# Patient Record
Sex: FEMALE | Race: WHITE | URBAN - METROPOLITAN AREA
[De-identification: names, ages, dates, MRNs, and addresses within clinical notes are randomized per-mention and may not be internally consistent; named-entity substitution may affect disease eponyms.]

---

## 2019-02-18 ENCOUNTER — HISTORICAL (OUTPATIENT)
Dept: ADMINISTRATIVE | Facility: HOSPITAL | Age: 19
End: 2019-02-18

## 2019-02-18 LAB
FLUAV AG NPH QL IA: NEGATIVE
FLUBV AG NPH QL IA: NEGATIVE

## 2019-10-10 ENCOUNTER — HISTORICAL (OUTPATIENT)
Dept: ADMINISTRATIVE | Facility: HOSPITAL | Age: 19
End: 2019-10-10

## 2019-10-10 LAB
ABS NEUT (OLG): 9.5 X10(3)/MCL (ref 2.1–9.2)
ALBUMIN SERPL-MCNC: 4.6 GM/DL (ref 3.4–5)
ALBUMIN/GLOB SERPL: 1.53 {RATIO} (ref 1.5–2.5)
ALP SERPL-CCNC: 72 UNIT/L (ref 38–126)
ALT SERPL-CCNC: 28 UNIT/L (ref 7–52)
APPEARANCE, UA: ABNORMAL
AST SERPL-CCNC: 38 UNIT/L (ref 15–37)
BACTERIA #/AREA URNS AUTO: ABNORMAL /HPF
BILIRUB SERPL-MCNC: 0.6 MG/DL (ref 0.2–1)
BILIRUB UR QL STRIP: ABNORMAL MG/DL
BILIRUBIN DIRECT+TOT PNL SERPL-MCNC: 0.1 MG/DL (ref 0–0.5)
BILIRUBIN DIRECT+TOT PNL SERPL-MCNC: 0.5 MG/DL
BUN SERPL-MCNC: 16 MG/DL (ref 7–18)
CALCIUM SERPL-MCNC: 9.8 MG/DL (ref 8.5–10)
CHLORIDE SERPL-SCNC: 100 MMOL/L (ref 98–107)
CO2 SERPL-SCNC: 18 MMOL/L (ref 21–32)
COLOR UR: YELLOW
CREAT SERPL-MCNC: 0.82 MG/DL (ref 0.6–1.3)
ERYTHROCYTE [DISTWIDTH] IN BLOOD BY AUTOMATED COUNT: 12.9 % (ref 11.5–17)
FLUAV AG NPH QL IA: NEGATIVE
FLUBV AG NPH QL IA: NEGATIVE
GLOBULIN SER-MCNC: 3 GM/DL (ref 1.2–3)
GLUCOSE (UA): ABNORMAL MG/DL
GLUCOSE SERPL-MCNC: 182 MG/DL (ref 74–106)
HCT VFR BLD AUTO: 40.6 % (ref 37–47)
HGB BLD-MCNC: 13.9 GM/DL (ref 12–16)
HGB UR QL STRIP: NEGATIVE UNIT/L
KETONES UR QL STRIP: ABNORMAL MG/DL
LEUKOCYTE ESTERASE UR QL STRIP: NEGATIVE UNIT/L
LYMPHOCYTES # BLD AUTO: 1.3 X10(3)/MCL (ref 0.6–3.4)
LYMPHOCYTES NFR BLD AUTO: 10.8 % (ref 13–40)
MCH RBC QN AUTO: 30.6 PG (ref 27–31.2)
MCHC RBC AUTO-ENTMCNC: 34 GM/DL (ref 32–36)
MCV RBC AUTO: 89 FL (ref 80–94)
MONOCYTES # BLD AUTO: 1.6 X10(3)/MCL (ref 0.1–1.3)
MONOCYTES NFR BLD AUTO: 12.8 % (ref 0.1–24)
NEUTROPHILS NFR BLD AUTO: 76.4 % (ref 47–80)
NITRITE UR QL STRIP.AUTO: NEGATIVE
PH UR STRIP: 5.5 [PH]
PLATELET # BLD AUTO: 429 X10(3)/MCL (ref 130–400)
PMV BLD AUTO: 8.4 FL (ref 9.4–12.4)
POTASSIUM SERPL-SCNC: 4.5 MMOL/L (ref 3.5–5.1)
PROT SERPL-MCNC: 7.6 GM/DL (ref 6.4–8.2)
PROT UR QL STRIP: ABNORMAL MG/DL
RBC # BLD AUTO: 4.54 X10(6)/MCL (ref 4.2–5.4)
RBC #/AREA URNS HPF: ABNORMAL /HPF
SODIUM SERPL-SCNC: 136 MMOL/L (ref 136–145)
SP GR UR STRIP: 1.02
SQUAMOUS EPITHELIAL, UA: ABNORMAL /LPF
UROBILINOGEN UR STRIP-ACNC: 0.2 MG/DL
WBC # SPEC AUTO: 12.4 X10(3)/MCL (ref 4.5–11.5)
WBC #/AREA URNS AUTO: ABNORMAL /[HPF]

## 2022-04-10 ENCOUNTER — HISTORICAL (OUTPATIENT)
Dept: ADMINISTRATIVE | Facility: HOSPITAL | Age: 22
End: 2022-04-10

## 2022-04-28 VITALS
DIASTOLIC BLOOD PRESSURE: 82 MMHG | OXYGEN SATURATION: 98 % | WEIGHT: 170.88 LBS | BODY MASS INDEX: 27.46 KG/M2 | SYSTOLIC BLOOD PRESSURE: 112 MMHG | HEIGHT: 66 IN

## 2022-05-03 NOTE — HISTORICAL [facility] CERNER
This is a historical note converted from Yas. Formatting and pictures may have been removed.  Please reference Yas for original formatting and attached multimedia. Chief Complaint  vomiting, bodyaches  History of Present Illness  2 day history of myalgias, nausea and vomiting.? Denies any?urinary complaints,?abdominal pain or diarrhea. ?History of type 1 diabetes.? She has had?low blood sugars today due to decreased?oral intake.? Difficulty?keeping fluids?down.? Denies any fever.? Patient had a similar episode in August.? 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  Review of Systems  Constitutional:?No weight loss, no fever, fatigue, chills, no night sweats,?no weakness  Eyes:?No blurred vision,?no redness,?no drainage,?no ocular?pain  HEENT:?No sore throat,?no ear pain, no sinus pressure, no nasal congestion, no rhinorrhea, no postnasal drip  Respiratory:?No cough, no wheezing, no sputum production, no shortness of breath  Cardiovascular:?No chest pain, no palpitations, no dyspnea on exertion,?no orthopnea  Gastrointestinal:?nausea, vomiting, no abdominal pain, no diarrhea,?no constipation, no melena,?no hematochezia  Genitourinary:?No dysuria, no hematuria, no frequency, no urgency, no incontinence, no discharge  Musculoskeletal:?myalgias, no arthralgias, no weakness, no joint effusion, no edema  Integumentary:?No rashes, no hives, no itching, no lesions, no jaundice  Neurologic:?No headaches, no numbness, no tingling, no weakness, no dizziness  Psychiatric:?No anxiety, no irritability, no depression,?no suicidal ideations, no?homicidal ideations,?no delusions, no hallucinations  Endocrine:?No polyuria, no polydipsia, no polyphagia  Hematology:?No bruising, no lymphadenopathy,?no paleness  ?  Physical Exam  Vitals & Measurements  T:?37.2? ?C (Oral)? HR:?110(Peripheral)? BP:?108/74?  HT:?167?cm? WT:?66.6?kg? BMI:?23.88? LMP:?09/22/2019 00:00 CDT?  General:?Appears  ill  Eye:?PERRLA, EOMI, Clear conjunctiva, Eyelids unremarkable  Ears:?Bilateral EAC clear?and?Bilateral TM clear  Nose:? Mucosa normal, No rhinorrhea, No lesions  O/P:?Mucous membranes dry,?no?erythema,?No tonsillar hypertrophy,?No tonsillar exudates,?No cobblestoning  Neck:?Soft, Nontender, No thyromegaly, Full range of motion, No cervical lymphadenopathy, No JVD  Cardiovascular:?Regular rate and rhythm, No murmurs, No gallops, No rubs  Respiratory:?Clear to auscultation bilaterally, No wheezes, No rhonchi, No?crackles  Abdomen:? Soft, Nontender, No hepatosplenomegaly, Normal and equal bowel sounds, No masses, No rebound, No guarding  Integumentary:?No rashes or skin lesions  ?  Assessment/Plan  1.?Gastroenteritis?K52.9  ?CBC, CMP, urinalysis today  Rapid flu-negative  Zofran 8 mg?x 1 dose now  Zofran 8 mg ODT every 8 hours as needed nausea vomiting  Alternate with Phenergan 25 mg every 6 hours as needed nausea vomiting if needed  Ordered:  ondansetron, 8 mg, Oral, Once-Unscheduled, first dose 10/10/19 12:20:00 CDT  ?  2.?Type 1 diabetes?E10.9  Discussed efforts to maintain adequate?intake and?decrease insulin?administration  ?  Orders:  ondansetron, 8 mg = 1 tab(s), Oral, q8hr, PRN PRN nausea/vomiting, # 10 tab(s), 0 Refill(s), Pharmacy: [facility]/pharmacy #[id number]  promethazine, 25 mg = 1 tab(s), Oral, q6hr, PRN PRN as needed for nausea/vomiting, # 10 tab(s), 0 Refill(s), Pharmacy: [facility]/pharmacy #[id number]  CBC w/ Auto Diff, Routine collect, 10/10/19 12:13:00 CDT, Blood, Order for future visit, Stop date 10/10/19 12:13:00 CDT, Lab Collect, Nausea & vomiting, 10/10/19 12:13:00 CDT  Comprehensive Metabolic Panel, Routine collect, 10/10/19 12:13:00 CDT, Blood, Order for future visit, Stop date 10/10/19 12:13:00 CDT, Lab Collect, Nausea & vomiting, 10/10/19 12:13:00 CDT  Lab Collection Request, 10/10/19 12:13:00 CDT, BLANK AMB - [facility], 10/10/19 12:13:00 CDT  Office/Outpatient Visit Level 4 Established [id number] PC, Nausea & vomiting,  [facility] - [facility], 10/10/19 12:13:00 CDT  Urinalysis Complete no reflex, Routine collect, Urine, Order for future visit, 10/10/19 12:13:00 CDT, Stop date 10/10/19 12:13:00 CDT, Nurse collect, Nausea & vomiting   Problem List/Past Medical History  Ongoing  Anxiety  Depression  Type 1 diabetes  Historical  No qualifying data  Procedure/Surgical History  Pyloric stenosis   Medications  DULoxetine 30 mg oral delayed release capsule, 30 mg= 1 cap(s), Oral, Daily  DULoxetine 60 mg oral delayed release capsule, 60 mg= 1 cap(s), Oral, Daily  HUMALOG 100 UNITS/ML CARTRIDGE  LANTUS SOLOSTAR 100 UNITS/ML  levothyroxine 25 mcg (0.025 mg) oral tablet, 25 mcg= 1 tab(s), Oral, qAM  Lo Loestrin Fe oral tablet, 1 tab(s), Oral, Daily  ondansetron 8 mg oral tablet, 8 mg, Oral, Once-Unscheduled  promethazine 25 mg oral tablet, 25 mg= 1 tab(s), Oral, q6hr, PRN  Zofran ODT 8 mg oral tablet, disintegrating, 8 mg= 1 tab(s), Oral, q8hr, PRN  Allergies  No Known Allergies  Social History  Abuse/Neglect  No, 10/10/2019  Alcohol  Never, 08/23/2018  Employment/School  Employed, Student, 08/23/2018  Tobacco  Never (less than 100 in lifetime), N/A, 10/10/2019  Never (less than 100 in lifetime), N/A, 04/15/2019  Never smoker, N/A, 12/06/2018  Never smoker, N/A, 11/19/2018  Household tobacco concerns: No., 11/21/2016  Family History  CVA - Cerebrovascular accident: Father.  Diabetes mellitus type 1.: Brother.  Hypertension.: Father.  Hypothyroidism.: Mother.  Health Maintenance  Health Maintenance  ???Pending?(in the next year)  ??? ??OverDue  ??? ? ? ?Alcohol Misuse Screening due??01/01/19??and every 1??year(s)  ??? ??Due?  ??? ? ? ?ADL Screening due??10/10/19??and every 1??year(s)  ??? ? ? ?Diabetes Maintenance-Eye Exam due??10/10/19??and every?  ??? ? ? ?Diabetes Maintenance-Foot Exam due??10/10/19??and every?  ??? ? ? ?Diabetes Maintenance-HgbA1c due??10/10/19??and every?  ??? ? ? ?Diabetes Maintenance-Fasting Lipid Profile due??10/10/19??and  every?  ??? ? ? ?Influenza Vaccine due??10/10/19??and every?  ??? ? ? ?Tetanus Vaccine due??10/10/19??and every 10??year(s)  ??? ??Due In Future?  ??? ? ? ?Obesity Screening not due until??01/01/20??and every 1??year(s)  ??? ? ? ?Blood Pressure Screening not due until??10/09/20??and every 1??year(s)  ??? ? ? ?Body Mass Index Check not due until??10/09/20??and every 1??year(s)  ???Satisfied?(in the past 1 year)  ??? ??Satisfied?  ??? ? ? ?Blood Pressure Screening on??10/10/19.??Satisfied by Margaret Ramon LPN  ??? ? ? ?Body Mass Index Check on??10/10/19.??Satisfied by Margaret Ramon LPN  ??? ? ? ?Influenza Vaccine on??10/10/19.??Satisfied by Margaret Ramon LPN  ??? ? ? ?Obesity Screening on??10/10/19.??Satisfied by Margaret Ramon LPN  ?  Lab Results  Test Name Test Result Date/Time   Influ A Ag Negative 10/10/2019 11:42 CDT   Influ B Ag Negative 10/10/2019 11:42 CDT